# Patient Record
Sex: FEMALE | Race: OTHER | NOT HISPANIC OR LATINO
[De-identification: names, ages, dates, MRNs, and addresses within clinical notes are randomized per-mention and may not be internally consistent; named-entity substitution may affect disease eponyms.]

---

## 2020-04-26 ENCOUNTER — MESSAGE (OUTPATIENT)
Age: 35
End: 2020-04-26

## 2020-05-02 LAB
SARS-COV-2 IGG SERPL IA-ACNC: <0.1 INDEX
SARS-COV-2 IGG SERPL QL IA: NEGATIVE

## 2020-05-18 ENCOUNTER — OUTPATIENT (OUTPATIENT)
Dept: OUTPATIENT SERVICES | Facility: HOSPITAL | Age: 35
LOS: 1 days | Discharge: HOME | End: 2020-05-18
Payer: COMMERCIAL

## 2020-05-18 DIAGNOSIS — A15.0 TUBERCULOSIS OF LUNG: ICD-10-CM

## 2020-05-18 PROCEDURE — 71046 X-RAY EXAM CHEST 2 VIEWS: CPT | Mod: 26

## 2024-05-31 ENCOUNTER — EMERGENCY (EMERGENCY)
Facility: HOSPITAL | Age: 39
LOS: 1 days | Discharge: HOME | End: 2024-05-31
Attending: EMERGENCY MEDICINE | Admitting: EMERGENCY MEDICINE
Payer: COMMERCIAL

## 2024-05-31 VITALS
SYSTOLIC BLOOD PRESSURE: 138 MMHG | HEART RATE: 73 BPM | RESPIRATION RATE: 16 BRPM | DIASTOLIC BLOOD PRESSURE: 87 MMHG | TEMPERATURE: 98 F | OXYGEN SATURATION: 100 %

## 2024-05-31 LAB
APPEARANCE UR: CLEAR — SIGNIFICANT CHANGE UP
BASE EXCESS BLDV CALC-SCNC: 1.4 MMOL/L — SIGNIFICANT CHANGE UP (ref -2–3)
BASOPHILS # BLD AUTO: 0.05 K/UL — SIGNIFICANT CHANGE UP (ref 0–0.2)
BASOPHILS NFR BLD AUTO: 0.6 % — SIGNIFICANT CHANGE UP (ref 0–2)
BILIRUB UR-MCNC: NEGATIVE — SIGNIFICANT CHANGE UP
BLOOD GAS VENOUS COMPREHENSIVE RESULT: SIGNIFICANT CHANGE UP
CHLORIDE BLDV-SCNC: 101 MMOL/L — SIGNIFICANT CHANGE UP (ref 96–108)
CO2 BLDV-SCNC: 31.2 MMOL/L — HIGH (ref 22–26)
COLOR SPEC: YELLOW — SIGNIFICANT CHANGE UP
DIFF PNL FLD: NEGATIVE — SIGNIFICANT CHANGE UP
EOSINOPHIL # BLD AUTO: 0.17 K/UL — SIGNIFICANT CHANGE UP (ref 0–0.5)
EOSINOPHIL NFR BLD AUTO: 2 % — SIGNIFICANT CHANGE UP (ref 0–6)
GAS PNL BLDV: 136 MMOL/L — SIGNIFICANT CHANGE UP (ref 136–145)
GAS PNL BLDV: SIGNIFICANT CHANGE UP
GLUCOSE BLDV-MCNC: 100 MG/DL — HIGH (ref 70–99)
GLUCOSE UR QL: NEGATIVE MG/DL — SIGNIFICANT CHANGE UP
HCO3 BLDV-SCNC: 29 MMOL/L — SIGNIFICANT CHANGE UP (ref 22–29)
HCT VFR BLD CALC: 38.1 % — SIGNIFICANT CHANGE UP (ref 34.5–45)
HCT VFR BLDA CALC: 38 % — SIGNIFICANT CHANGE UP (ref 34.5–46.5)
HGB BLD CALC-MCNC: 12.6 G/DL — SIGNIFICANT CHANGE UP (ref 11.7–16.1)
HGB BLD-MCNC: 12 G/DL — SIGNIFICANT CHANGE UP (ref 11.5–15.5)
IANC: 6.84 K/UL — SIGNIFICANT CHANGE UP (ref 1.8–7.4)
IMM GRANULOCYTES NFR BLD AUTO: 0.4 % — SIGNIFICANT CHANGE UP (ref 0–0.9)
KETONES UR-MCNC: NEGATIVE MG/DL — SIGNIFICANT CHANGE UP
LACTATE BLDV-MCNC: 1.5 MMOL/L — SIGNIFICANT CHANGE UP (ref 0.5–2)
LEUKOCYTE ESTERASE UR-ACNC: NEGATIVE — SIGNIFICANT CHANGE UP
LYMPHOCYTES # BLD AUTO: 0.67 K/UL — LOW (ref 1–3.3)
LYMPHOCYTES # BLD AUTO: 8 % — LOW (ref 13–44)
MCHC RBC-ENTMCNC: 30.1 PG — SIGNIFICANT CHANGE UP (ref 27–34)
MCHC RBC-ENTMCNC: 31.5 GM/DL — LOW (ref 32–36)
MCV RBC AUTO: 95.5 FL — SIGNIFICANT CHANGE UP (ref 80–100)
MONOCYTES # BLD AUTO: 0.57 K/UL — SIGNIFICANT CHANGE UP (ref 0–0.9)
MONOCYTES NFR BLD AUTO: 6.8 % — SIGNIFICANT CHANGE UP (ref 2–14)
NEUTROPHILS # BLD AUTO: 6.84 K/UL — SIGNIFICANT CHANGE UP (ref 1.8–7.4)
NEUTROPHILS NFR BLD AUTO: 82.2 % — HIGH (ref 43–77)
NITRITE UR-MCNC: NEGATIVE — SIGNIFICANT CHANGE UP
NRBC # BLD: 0 /100 WBCS — SIGNIFICANT CHANGE UP (ref 0–0)
NRBC # FLD: 0 K/UL — SIGNIFICANT CHANGE UP (ref 0–0)
PCO2 BLDV: 61 MMHG — HIGH (ref 39–52)
PH BLDV: 7.29 — LOW (ref 7.32–7.43)
PH UR: 6.5 — SIGNIFICANT CHANGE UP (ref 5–8)
PLATELET # BLD AUTO: 211 K/UL — SIGNIFICANT CHANGE UP (ref 150–400)
PO2 BLDV: 21 MMHG — LOW (ref 25–45)
POTASSIUM BLDV-SCNC: 3.5 MMOL/L — SIGNIFICANT CHANGE UP (ref 3.5–5.1)
PROT UR-MCNC: NEGATIVE MG/DL — SIGNIFICANT CHANGE UP
RBC # BLD: 3.99 M/UL — SIGNIFICANT CHANGE UP (ref 3.8–5.2)
RBC # FLD: 11.3 % — SIGNIFICANT CHANGE UP (ref 10.3–14.5)
SAO2 % BLDV: 23.1 % — LOW (ref 67–88)
SP GR SPEC: 1.01 — SIGNIFICANT CHANGE UP (ref 1–1.03)
UROBILINOGEN FLD QL: 0.2 MG/DL — SIGNIFICANT CHANGE UP (ref 0.2–1)
WBC # BLD: 8.33 K/UL — SIGNIFICANT CHANGE UP (ref 3.8–10.5)
WBC # FLD AUTO: 8.33 K/UL — SIGNIFICANT CHANGE UP (ref 3.8–10.5)

## 2024-05-31 PROCEDURE — 76830 TRANSVAGINAL US NON-OB: CPT | Mod: 26

## 2024-05-31 PROCEDURE — 99284 EMERGENCY DEPT VISIT MOD MDM: CPT

## 2024-05-31 RX ORDER — SODIUM CHLORIDE 9 MG/ML
1000 INJECTION INTRAMUSCULAR; INTRAVENOUS; SUBCUTANEOUS ONCE
Refills: 0 | Status: COMPLETED | OUTPATIENT
Start: 2024-05-31 | End: 2024-05-31

## 2024-05-31 RX ADMIN — SODIUM CHLORIDE 1000 MILLILITER(S): 9 INJECTION INTRAMUSCULAR; INTRAVENOUS; SUBCUTANEOUS at 23:00

## 2024-05-31 NOTE — ED PROVIDER NOTE - PROGRESS NOTE DETAILS
yolanda: ultrasound results reviewed with the patient.  as pt has known cyst requiring surgery in 3 days on left, she can bring results of US to her obgyn, pt without signs of torsion on this US but does have large ovary so has risk,  pt has been pain free during time i9n the ED.  advised to return if sudden onset of severe pain like previously.

## 2024-05-31 NOTE — ED PROVIDER NOTE - NSFOLLOWUPINSTRUCTIONS_ED_ALL_ED_FT
you have ovarian cysts on the right and the left. you do not have a torsion on the current ultrasound.  if you have sudden onset of abdominal pain please return to the ED immediately as you could have a torsion of your ovary and be at risk of losing the ovary if not tended to.    your other labs are also attached.  please show them to your ob before your surgery.    You are being discharged from the Emergency Department after evaluation of your presenting problem.  You were found not to have an emergency that requires hospitalization or surgery, but this does not mean you do not have a health concern.  You should follow up with your primary care physician and any other physicians suggested at time of discharge.  Also, if your condition worsens or changes know that the emergency department is open and available 24 hours a day/ 7 days a week and you should return to us if you have concerns. Thank you for allowing us to participate in your care.

## 2024-05-31 NOTE — ED PROVIDER NOTE - CLINICAL SUMMARY MEDICAL DECISION MAKING FREE TEXT BOX
Nancy Diego MD attending physician patient is a 38-year-old woman who comes in with known history of ovarian cyst who had severe abdominal pain that is now improving and was vomiting from the abdominal pain.  She actually has surgery planned for 3 days from now for the cyst on the left which she believes is about 6 cm the pain was on the right.  She denies fever chest pain shortness of breath.  She does have some back pain.    Vital signs include blood pressure 138/87 respiratory rate 16 heart rate 73 afebrile O2 sat is normal.  Pt alert and can phonate well  h at/nc  perrl, conj clear, sclera anicteric,  neck supple  abd soft no r/g/ mild suprapubic tenderness  GYN exam chaperoned by maddison Webb.  No external lesions no discharge no bleeding.  No bladder tenderness no right or left wall tenderness vaginal walls.  Uterus very firm cervix large uterus tenderness right-sided adnexa.  ext no edema no deformities  neueo awake, lucid normal gait moves all extremities with strength  psych normal affect  vs reasonable    Plan transvaginal ultrasound and lab work.  This is likely GYN related.  Patient not currently in pain but could be a rupture or could be a torsion

## 2024-05-31 NOTE — ED ADULT TRIAGE NOTE - CHIEF COMPLAINT QUOTE
pt c/o RLQ pain and vomiting x2 hours. LMP May 2024. hx. ovarian cyst. pt denies chest pain, sob, vaginal bleeding, fever. pt well appearing.

## 2024-05-31 NOTE — ED ADULT TRIAGE NOTE - SOURCE OF INFORMATION
Patient Split-Thickness Skin Graft Text: The defect edges were debeveled with a #15 scalpel blade.  Given the location of the defect, shape of the defect and the proximity to free margins a split thickness skin graft was deemed most appropriate.  Using a sterile surgical marker, the primary defect shape was transferred to the donor site. The split thickness graft was then harvested.  The skin graft was then placed in the primary defect and oriented appropriately.

## 2024-05-31 NOTE — ED PROVIDER NOTE - PATIENT PORTAL LINK FT
You can access the FollowMyHealth Patient Portal offered by [facility] by registering at the following website: [email or web address]. By joining [employer]’s FollowMyHealth portal, you will also be able to view your health information using other applications (apps) compatible with our system.

## 2024-06-01 LAB
ALBUMIN SERPL ELPH-MCNC: 4.4 G/DL — SIGNIFICANT CHANGE UP (ref 3.3–5)
ALP SERPL-CCNC: 56 U/L — SIGNIFICANT CHANGE UP (ref 40–120)
ALT FLD-CCNC: 14 U/L — SIGNIFICANT CHANGE UP (ref 4–33)
ANION GAP SERPL CALC-SCNC: 16 MMOL/L — HIGH (ref 7–14)
AST SERPL-CCNC: 22 U/L — SIGNIFICANT CHANGE UP (ref 4–32)
BILIRUB SERPL-MCNC: 0.5 MG/DL — SIGNIFICANT CHANGE UP (ref 0.2–1.2)
BUN SERPL-MCNC: 10 MG/DL — SIGNIFICANT CHANGE UP (ref 7–23)
CALCIUM SERPL-MCNC: 9.2 MG/DL — SIGNIFICANT CHANGE UP (ref 8.4–10.5)
CHLORIDE SERPL-SCNC: 100 MMOL/L — SIGNIFICANT CHANGE UP (ref 98–107)
CO2 SERPL-SCNC: 23 MMOL/L — SIGNIFICANT CHANGE UP (ref 22–31)
CREAT SERPL-MCNC: 0.63 MG/DL — SIGNIFICANT CHANGE UP (ref 0.5–1.3)
EGFR: 115 ML/MIN/1.73M2 — SIGNIFICANT CHANGE UP
GLUCOSE SERPL-MCNC: 104 MG/DL — HIGH (ref 70–99)
HCG SERPL-ACNC: <1 MIU/ML — SIGNIFICANT CHANGE UP
HCV AB S/CO SERPL IA: 0.12 S/CO — SIGNIFICANT CHANGE UP (ref 0–0.99)
HCV AB SERPL-IMP: SIGNIFICANT CHANGE UP
LIDOCAIN IGE QN: 58 U/L — SIGNIFICANT CHANGE UP (ref 7–60)
POTASSIUM SERPL-MCNC: 3.6 MMOL/L — SIGNIFICANT CHANGE UP (ref 3.5–5.3)
POTASSIUM SERPL-SCNC: 3.6 MMOL/L — SIGNIFICANT CHANGE UP (ref 3.5–5.3)
PROT SERPL-MCNC: 7.8 G/DL — SIGNIFICANT CHANGE UP (ref 6–8.3)
SODIUM SERPL-SCNC: 139 MMOL/L — SIGNIFICANT CHANGE UP (ref 135–145)

## 2024-06-01 NOTE — ED ADULT NURSE REASSESSMENT NOTE - NS ED NURSE REASSESS COMMENT FT1
Pt received from intake to results waiting. Well appearing sitting up in chair HOB elevated. C/o mild right lower quadrant abdominal pain at this time. Denies CP, SOB, nausea, vomiting, headache, lightheadedness, dizziness, fever or chills. Airway patent, speaking in full and complete sentences. Respirations even and unlabored with equal chest rise. No acute distress noted. Safety maintained. Awaiting US results.

## 2024-06-02 LAB
CULTURE RESULTS: SIGNIFICANT CHANGE UP
SPECIMEN SOURCE: SIGNIFICANT CHANGE UP

## 2025-07-08 ENCOUNTER — EMERGENCY (EMERGENCY)
Facility: HOSPITAL | Age: 40
LOS: 1 days | End: 2025-07-08
Attending: STUDENT IN AN ORGANIZED HEALTH CARE EDUCATION/TRAINING PROGRAM | Admitting: STUDENT IN AN ORGANIZED HEALTH CARE EDUCATION/TRAINING PROGRAM
Payer: COMMERCIAL

## 2025-07-08 VITALS
RESPIRATION RATE: 16 BRPM | OXYGEN SATURATION: 100 % | TEMPERATURE: 98 F | DIASTOLIC BLOOD PRESSURE: 67 MMHG | HEART RATE: 64 BPM | SYSTOLIC BLOOD PRESSURE: 118 MMHG

## 2025-07-08 VITALS
SYSTOLIC BLOOD PRESSURE: 143 MMHG | RESPIRATION RATE: 18 BRPM | HEIGHT: 62 IN | WEIGHT: 100.09 LBS | DIASTOLIC BLOOD PRESSURE: 90 MMHG | OXYGEN SATURATION: 98 % | HEART RATE: 66 BPM | TEMPERATURE: 98 F

## 2025-07-08 LAB
ALBUMIN SERPL ELPH-MCNC: 4.6 G/DL — SIGNIFICANT CHANGE UP (ref 3.3–5)
ALP SERPL-CCNC: 44 U/L — SIGNIFICANT CHANGE UP (ref 40–120)
ALT FLD-CCNC: 12 U/L — SIGNIFICANT CHANGE UP (ref 4–33)
ANION GAP SERPL CALC-SCNC: 13 MMOL/L — SIGNIFICANT CHANGE UP (ref 7–14)
APPEARANCE UR: CLEAR — SIGNIFICANT CHANGE UP
AST SERPL-CCNC: 19 U/L — SIGNIFICANT CHANGE UP (ref 4–32)
BACTERIA # UR AUTO: NEGATIVE /HPF — SIGNIFICANT CHANGE UP
BASOPHILS # BLD AUTO: 0.03 K/UL — SIGNIFICANT CHANGE UP (ref 0–0.2)
BASOPHILS # BLD MANUAL: 0 K/UL — SIGNIFICANT CHANGE UP (ref 0–0.2)
BASOPHILS NFR BLD AUTO: 0.4 % — SIGNIFICANT CHANGE UP (ref 0–2)
BASOPHILS NFR BLD MANUAL: 0 % — SIGNIFICANT CHANGE UP (ref 0–2)
BILIRUB SERPL-MCNC: 0.7 MG/DL — SIGNIFICANT CHANGE UP (ref 0.2–1.2)
BILIRUB UR-MCNC: NEGATIVE — SIGNIFICANT CHANGE UP
BUN SERPL-MCNC: 12 MG/DL — SIGNIFICANT CHANGE UP (ref 7–23)
CALCIUM SERPL-MCNC: 8.9 MG/DL — SIGNIFICANT CHANGE UP (ref 8.4–10.5)
CAST: 0 /LPF — SIGNIFICANT CHANGE UP (ref 0–4)
CHLORIDE SERPL-SCNC: 101 MMOL/L — SIGNIFICANT CHANGE UP (ref 98–107)
CO2 SERPL-SCNC: 20 MMOL/L — LOW (ref 22–31)
COLOR SPEC: YELLOW — SIGNIFICANT CHANGE UP
CREAT SERPL-MCNC: 0.47 MG/DL — LOW (ref 0.5–1.3)
DIFF PNL FLD: ABNORMAL
EGFR: 123 ML/MIN/1.73M2 — SIGNIFICANT CHANGE UP
EGFR: 123 ML/MIN/1.73M2 — SIGNIFICANT CHANGE UP
EOSINOPHIL # BLD AUTO: 0 K/UL — SIGNIFICANT CHANGE UP (ref 0–0.5)
EOSINOPHIL # BLD MANUAL: 0 K/UL — SIGNIFICANT CHANGE UP (ref 0–0.5)
EOSINOPHIL NFR BLD AUTO: 0 % — SIGNIFICANT CHANGE UP (ref 0–6)
EOSINOPHIL NFR BLD MANUAL: 0 % — SIGNIFICANT CHANGE UP (ref 0–6)
GIANT PLATELETS BLD QL SMEAR: PRESENT
GLUCOSE SERPL-MCNC: 135 MG/DL — HIGH (ref 70–99)
GLUCOSE UR QL: NEGATIVE MG/DL — SIGNIFICANT CHANGE UP
HCG SERPL-ACNC: <1 MIU/ML — SIGNIFICANT CHANGE UP
HCT VFR BLD CALC: 36.4 % — SIGNIFICANT CHANGE UP (ref 34.5–45)
HGB BLD-MCNC: 11.9 G/DL — SIGNIFICANT CHANGE UP (ref 11.5–15.5)
IMM GRANULOCYTES # BLD AUTO: 0.03 K/UL — SIGNIFICANT CHANGE UP (ref 0–0.07)
IMM GRANULOCYTES NFR BLD AUTO: 0.4 % — SIGNIFICANT CHANGE UP (ref 0–0.9)
KETONES UR QL: 15 MG/DL
LEUKOCYTE ESTERASE UR-ACNC: NEGATIVE — SIGNIFICANT CHANGE UP
LIDOCAIN IGE QN: 51 U/L — SIGNIFICANT CHANGE UP (ref 7–60)
LYMPHOCYTES # BLD AUTO: 0.37 K/UL — LOW (ref 1–3.3)
LYMPHOCYTES # BLD MANUAL: 0.21 K/UL — LOW (ref 1–3.3)
LYMPHOCYTES NFR BLD AUTO: 4.6 % — LOW (ref 13–44)
LYMPHOCYTES NFR BLD MANUAL: 2.6 % — LOW (ref 13–44)
MCHC RBC-ENTMCNC: 31.1 PG — SIGNIFICANT CHANGE UP (ref 27–34)
MCHC RBC-ENTMCNC: 32.7 G/DL — SIGNIFICANT CHANGE UP (ref 32–36)
MCV RBC AUTO: 95 FL — SIGNIFICANT CHANGE UP (ref 80–100)
MONOCYTES # BLD AUTO: 0.34 K/UL — SIGNIFICANT CHANGE UP (ref 0–0.9)
MONOCYTES # BLD MANUAL: 0.14 K/UL — SIGNIFICANT CHANGE UP (ref 0–0.9)
MONOCYTES NFR BLD AUTO: 4.2 % — SIGNIFICANT CHANGE UP (ref 2–14)
MONOCYTES NFR BLD MANUAL: 1.8 % — LOW (ref 2–14)
NEUTROPHILS # BLD AUTO: 7.26 K/UL — SIGNIFICANT CHANGE UP (ref 1.8–7.4)
NEUTROPHILS # BLD MANUAL: 7.68 K/UL — HIGH (ref 1.8–7.4)
NEUTROPHILS NFR BLD AUTO: 90.4 % — HIGH (ref 43–77)
NEUTROPHILS NFR BLD MANUAL: 95.6 % — HIGH (ref 43–77)
NITRITE UR-MCNC: NEGATIVE — SIGNIFICANT CHANGE UP
NRBC # BLD AUTO: 0 K/UL — SIGNIFICANT CHANGE UP (ref 0–0)
NRBC # FLD: 0 K/UL — SIGNIFICANT CHANGE UP (ref 0–0)
NRBC BLD AUTO-RTO: 0 /100 WBCS — SIGNIFICANT CHANGE UP (ref 0–0)
PH UR: 6.5 — SIGNIFICANT CHANGE UP (ref 5–8)
PLAT MORPH BLD: NORMAL — SIGNIFICANT CHANGE UP
PLATELET # BLD AUTO: 214 K/UL — SIGNIFICANT CHANGE UP (ref 150–400)
PLATELET COUNT - ESTIMATE: NORMAL — SIGNIFICANT CHANGE UP
PMV BLD: 11.3 FL — SIGNIFICANT CHANGE UP (ref 7–13)
POTASSIUM SERPL-MCNC: 3.8 MMOL/L — SIGNIFICANT CHANGE UP (ref 3.5–5.3)
POTASSIUM SERPL-SCNC: 3.8 MMOL/L — SIGNIFICANT CHANGE UP (ref 3.5–5.3)
PROT SERPL-MCNC: 7.6 G/DL — SIGNIFICANT CHANGE UP (ref 6–8.3)
PROT UR-MCNC: NEGATIVE MG/DL — SIGNIFICANT CHANGE UP
RBC # BLD: 3.83 M/UL — SIGNIFICANT CHANGE UP (ref 3.8–5.2)
RBC # FLD: 11.7 % — SIGNIFICANT CHANGE UP (ref 10.3–14.5)
RBC BLD AUTO: NORMAL — SIGNIFICANT CHANGE UP
RBC CASTS # UR COMP ASSIST: 4 /HPF — SIGNIFICANT CHANGE UP (ref 0–4)
SMUDGE CELLS # BLD: PRESENT
SODIUM SERPL-SCNC: 134 MMOL/L — LOW (ref 135–145)
SP GR SPEC: 1.02 — SIGNIFICANT CHANGE UP (ref 1–1.03)
SQUAMOUS # UR AUTO: 1 /HPF — SIGNIFICANT CHANGE UP (ref 0–5)
UROBILINOGEN FLD QL: 0.2 MG/DL — SIGNIFICANT CHANGE UP (ref 0.2–1)
WBC # BLD: 8.03 K/UL — SIGNIFICANT CHANGE UP (ref 3.8–10.5)
WBC # FLD AUTO: 8.03 K/UL — SIGNIFICANT CHANGE UP (ref 3.8–10.5)
WBC UR QL: 0 /HPF — SIGNIFICANT CHANGE UP (ref 0–5)

## 2025-07-08 PROCEDURE — 76830 TRANSVAGINAL US NON-OB: CPT | Mod: 26

## 2025-07-08 PROCEDURE — 74177 CT ABD & PELVIS W/CONTRAST: CPT | Mod: 26

## 2025-07-08 PROCEDURE — 99284 EMERGENCY DEPT VISIT MOD MDM: CPT

## 2025-07-08 PROCEDURE — 99285 EMERGENCY DEPT VISIT HI MDM: CPT

## 2025-07-08 RX ORDER — KETOROLAC TROMETHAMINE 30 MG/ML
15 INJECTION, SOLUTION INTRAMUSCULAR; INTRAVENOUS ONCE
Refills: 0 | Status: DISCONTINUED | OUTPATIENT
Start: 2025-07-08 | End: 2025-07-08

## 2025-07-08 RX ORDER — OXYCODONE HYDROCHLORIDE 30 MG/1
1 TABLET ORAL
Qty: 5 | Refills: 0
Start: 2025-07-08 | End: 2025-07-10

## 2025-07-08 RX ORDER — ACETAMINOPHEN 500 MG/5ML
675 LIQUID (ML) ORAL ONCE
Refills: 0 | Status: COMPLETED | OUTPATIENT
Start: 2025-07-08 | End: 2025-07-08

## 2025-07-08 RX ORDER — OXYCODONE HYDROCHLORIDE 30 MG/1
1 TABLET ORAL
Refills: 0
Start: 2025-07-08

## 2025-07-08 RX ORDER — OXYCODONE HYDROCHLORIDE 30 MG/1
1 TABLET ORAL
Qty: 3 | Refills: 0
Start: 2025-07-08 | End: 2025-07-08

## 2025-07-08 RX ORDER — OXYCODONE HYDROCHLORIDE AND ACETAMINOPHEN 10; 325 MG/1; MG/1
1 TABLET ORAL
Refills: 0
Start: 2025-07-08

## 2025-07-08 RX ORDER — ONDANSETRON HCL/PF 4 MG/2 ML
4 VIAL (ML) INJECTION ONCE
Refills: 0 | Status: COMPLETED | OUTPATIENT
Start: 2025-07-08 | End: 2025-07-08

## 2025-07-08 RX ADMIN — Medication 1400 MILLILITER(S): at 02:05

## 2025-07-08 RX ADMIN — KETOROLAC TROMETHAMINE 15 MILLIGRAM(S): 30 INJECTION, SOLUTION INTRAMUSCULAR; INTRAVENOUS at 09:31

## 2025-07-08 RX ADMIN — Medication 4 MILLIGRAM(S): at 02:05

## 2025-07-08 RX ADMIN — Medication 4 MILLIGRAM(S): at 06:34

## 2025-07-08 RX ADMIN — Medication 270 MILLIGRAM(S): at 04:21

## 2025-07-08 RX ADMIN — Medication 4 MILLIGRAM(S): at 02:04

## 2025-07-08 RX ADMIN — Medication 2 MILLIGRAM(S): at 09:30

## 2025-07-08 NOTE — ED PROVIDER NOTE - PROGRESS NOTE DETAILS
DO Sabine (PGY2): Received sign out, pending CT results.   CT showed large right adnexal cystic lesion, 5 x 3.7 x 4.2 cm. US ordered to rule out torsion.   On reassessment, the patient still complains of pain. Toradol IV ordered.  Consulted OBGYN who will come evaluate the patient. CT shows large complex cyst in the right ovary, pending OBGYN consult Aileen Over, DO --pt signed out to me at shift change by Dr. Jj pending CT results, reeval  on reeval, pt reports still having significant pain in her right side. treated with additional morphine. DO Sabine (PGY2):  US showed 4.9 cm complex right ovarian cyst which may represent hemorrhagic cyst.   Informed OBGYN of results, pending their recommendations. DO Sabine (PGY2):  Evaluated by OBGYN. No acute interventions at this time but reevaluate for pain in another hour. DO Sabine (PGY2):  Reevaluated by OBGYN. Patient reports improvement of her pain. Recommended prescription for oxycodone for severe pain. Recommended taking Tylenol and Ibuprofen for pain.Instructed the importance of following up with their OBGYN. OBGYN referral given. Strict return precautions given. The patient expressed understanding and feels comfortable being discharged home. DO Sabine (PGY2):  Reevaluated by OBGYN. Patient reports improvement of her pain. Recommended prescription for oxycodone for severe pain. Recommended taking Tylenol and Ibuprofen for pain. Instructed the importance of following up with their OBGYN. OBGYN referral given. Strict return precautions given. The patient expressed understanding and feels comfortable being discharged home.

## 2025-07-08 NOTE — ED PROVIDER NOTE - NSFOLLOWUPINSTRUCTIONS_ED_ALL_ED_FT
YOU WERE SEEN IN THE ED FOR: right hemorrhagic ovarian cyst    Follow up with your OBGYN this week.     Please continue to take any home medications as prescribed  Your test results from your ED visit were discussed with you prior to discharge  You were provided with a copy of your test results    Please take Acetaminophen 975 mg every 6 hours as needed for pain. Please do not exceed more than 4,000 mg of Tylenol in a day  Please take Ibuprofen 600 mg by mouth every 6 hours as needed for pain. Please take this medication with food.  You were given a prescription for oxycodone 5mg. Please take 1 tablet every 8 hours as needed for SEVERE pain.    Ovarian Cyst  An ovarian cyst is a fluid-filled sac that grows in or on an ovary. You have 2 ovaries, 1 on each side of your uterus. They are small, about the size and shape of an almond. Ovarian cysts are common in women who have regular monthly cycles. During your monthly cycle, eggs are released from the ovaries. The cyst usually contains fluid but may sometimes have blood or tissue in it. Most ovarian cysts are harmless and go away without treatment in a few months. Some cysts can grow large, cause pain, or break open.    SEEK IMMEDIATE MEDICAL CARE IF YOU HAVE ANY OF THE FOLLOWING SYMPTOMS: worsening abdominal pain, uncontrollable vomiting, lightheadedness, dizziness, vaginal bleeding or discharge (saturating 1 pad per hour for 2+ hours), fever

## 2025-07-08 NOTE — ED ADULT NURSE NOTE - OBJECTIVE STATEMENT
Pt received in intake 16. Pt alert and oriented x 4, ambulatory at baseline. Hx of uterine fibroid. Pt c/o lower back pain that began yesterday morning and progressively worsened. Pt endorses dysuria. Pt reports taking Tylenol and Advil at home with no relief. Pt reports 3 episodes of nonbloody vomit. Respirations even and unlabored, NAD. Pt denies chest pain, shortness of breath, diarrhea, headache, dizziness, weakness, fever, chills. 20G IV in the right AC labs drawn and pt medicated per orders. Bed in lowest position, safety maintained.

## 2025-07-08 NOTE — CONSULT NOTE ADULT - SUBJECTIVE AND OBJECTIVE BOX
CALIN BILLINGSLEY  39y  Female [id number]    HPI: 40 y/o P0 LMP 7/2 presenting with RLQ pain since yesterday morning. Patient reports pain started in the AM, has progressively worsened. Reports 3-4 episodes of emesis yesterday evening. Took Tylenol yesterday with minimal relief. States pain is now radiating along her right flank and down her right leg. Denies fevers, chills, SOB, chest pain, abnormal vaginal discharge, dysuria.       Name of GYN Physician: [facility]  OBHx: P0  GynHx: Hx fibroids, cysts and endometriosis. Denies STI's, Abnormal pap smears   PMH: Denies  PSH: LSC ovarian cystectomy, Robotic L oophorectomy, UAE for fibroids  Meds: Denies  Allx: NKDA  Social History:  Denies smoking use, drug use, alcohol use.       Vital Signs Last 24 Hrs  T(C): 36.9 (2025 09:14), Max: 36.9 (2025 09:14)  T(F): 98.5 (2025 09:14), Max: 98.5 (2025 09:14)  HR: 66 (2025 09:14) (60 - 66)  BP: 136/66 (2025 09:14) (136/66 - 155/94)  BP(mean): --  RR: 18 (2025 09:14) (16 - 18)  SpO2: 100% (2025 09:14) (98% - 100%)    Parameters below as of 2025 09:14  Patient On (Oxygen Delivery Method): room air        Physical Exam:   General: sitting comfortably in bed, NAD   HEENT: neck supple, full ROM  CV: well perfused  Lungs: normal respirations   Abd: Soft, non-distended. Mild tenderness to deep palpation of RLQ.   :  No bleeding on pad.    LABS:                        11.9   8.03  )-----------( 214      ( 2025 02:03 )             36.4     07-08    134[L]  |  101  |  12  ----------------------------<  135[H]  3.8   |  20[L]  |  0.47[L]    Ca    8.9      2025 02:03    TPro  7.6  /  Alb  4.6  /  TBili  0.7  /  DBili  x   /  AST  19  /  ALT  12  /  AlkPhos  44  -    I&O's Detail      Urinalysis Basic - ( 2025 03:30 )    Color: Yellow / Appearance: Clear / S.020 / pH: x  Gluc: x / Ketone: x  / Bili: Negative / Urobili: 0.2 mg/dL   Blood: x / Protein: Negative mg/dL / Nitrite: Negative   Leuk Esterase: Negative / RBC: 4 /HPF / WBC 0 /HPF   Sq Epi: x / Non Sq Epi: 1 /HPF / Bacteria: Negative /HPF        RADIOLOGY & ADDITIONAL STUDIES:    < from: CT Abdomen and Pelvis w/ IV Cont (25 @ 07:14) >  ACC: [id number] EXAM:  CT ABDOMEN AND PELVIS IC   ORDERED BY: DELFIN DRIVER     PROCEDURE DATE:  2025          INTERPRETATION:  CLINICAL INFORMATION: Right flank pain. Nausea and   vomiting. Assess for renal stone versus pyelonephritis.    COMPARISON: Pelvic ultrasound 2024.    CONTRAST/COMPLICATIONS:  IV Contrast: Omnipaque 350  90 cc administered   10 cc discarded  Oral Contrast: NONE.    PROCEDURE:  CT of the Abdomen and Pelvis was performed.  Sagittal and coronal reformats were performed.    FINDINGS:  LOWER CHEST: Within normal limits.    LIVER: Ill-defined hypoattenuation along the falciform ligament, likely   focal fat. Additional subcentimeter hypoattenuating hepatic foci, too   small to characterize.  BILE DUCTS: Normal caliber.  GALLBLADDER: Within normal limits.  SPLEEN: Within normal limits.  PANCREAS: Within normal limits.  ADRENALS: Within normal limits.  KIDNEYS/URETERS: Bilateral symmetric renal enhancement. No   hydronephrosis. Left renal upper pole cyst. Bilateral renal subcentimeter   hypoattenuating foci, too small to characterize.    BLADDER: Within normal limits.  REPRODUCTIVE ORGANS: Uterine fibroids. Large right adnexal cystic lesion,   5 x 3.7 x 4.2 cm.    BOWEL: No bowel obstruction. Appendix is not visualized. No evidence of   inflammation in the pericecal region.  PERITONEUM/RETROPERITONEUM: Trace free fluid in the pelvis.  VESSELS: Within normal limits.  LYMPH NODES: No lymphadenopathy.  ABDOMINAL WALL: Within normal limits.  BONES: Within normal limits.    IMPRESSION:  No CT evidence of renal stone or pyelonephritis as clinically questioned.    Large right adnexal cystic lesion, 5 x 3.7 x 4.2 cm. Note large size is a   risk factor for ovarian torsion. Consider further evaluation with pelvic   ultrasound.    --- End of Report ---      DANETTE MO MD; Resident Radiologist  This document has been electronically signed.  MARILIN TIJERINA MD; Attending Radiologist  This document has been electronically signed. 2025  8:50AM    < end of copied text >   CALIN BILLINGSLEY  39y  Female [id number]    HPI: 38 y/o P0 LMP 7/2 presenting with RLQ pain since yesterday morning. Patient reports pain started in the AM, has progressively worsened. Reports 3-4 episodes of emesis yesterday evening. Took Tylenol yesterday with minimal relief. States pain is now radiating along her right flank and down her right leg. Denies fevers, chills, SOB, chest pain, abnormal vaginal discharge, dysuria.       Name of GYN Physician: [facility]  OBHx: P0  GynHx: Hx fibroids, cysts and endometriosis. Denies STI's, Abnormal pap smears   PMH: Denies  PSH: LSC ovarian cystectomy, Robotic L oophorectomy, UAE for fibroids  Meds: Denies  Allx: NKDA  Social History:  Denies smoking use, drug use, alcohol use.       Vital Signs Last 24 Hrs  T(C): 36.9 (2025 09:14), Max: 36.9 (2025 09:14)  T(F): 98.5 (2025 09:14), Max: 98.5 (2025 09:14)  HR: 66 (2025 09:14) (60 - 66)  BP: 136/66 (2025 09:14) (136/66 - 155/94)  BP(mean): --  RR: 18 (2025 09:14) (16 - 18)  SpO2: 100% (2025 09:14) (98% - 100%)    Parameters below as of 2025 09:14  Patient On (Oxygen Delivery Method): room air        Physical Exam:   General: sitting comfortably in bed, NAD   HEENT: neck supple, full ROM  CV: well perfused  Lungs: normal respirations   Abd: Soft, non-distended. Mild tenderness to deep palpation of RLQ.   :  No bleeding on pad.    LABS:                        11.9   8.03  )-----------( 214      ( 2025 02:03 )             36.4     07-08    134[L]  |  101  |  12  ----------------------------<  135[H]  3.8   |  20[L]  |  0.47[L]    Ca    8.9      2025 02:03    TPro  7.6  /  Alb  4.6  /  TBili  0.7  /  DBili  x   /  AST  19  /  ALT  12  /  AlkPhos  44  -    I&O's Detail      Urinalysis Basic - ( 2025 03:30 )    Color: Yellow / Appearance: Clear / S.020 / pH: x  Gluc: x / Ketone: x  / Bili: Negative / Urobili: 0.2 mg/dL   Blood: x / Protein: Negative mg/dL / Nitrite: Negative   Leuk Esterase: Negative / RBC: 4 /HPF / WBC 0 /HPF   Sq Epi: x / Non Sq Epi: 1 /HPF / Bacteria: Negative /HPF        RADIOLOGY & ADDITIONAL STUDIES:    < from: CT Abdomen and Pelvis w/ IV Cont (25 @ 07:14) >  ACC: [id number] EXAM:  CT ABDOMEN AND PELVIS IC   ORDERED BY: DELFIN DRIVER     PROCEDURE DATE:  2025          INTERPRETATION:  CLINICAL INFORMATION: Right flank pain. Nausea and   vomiting. Assess for renal stone versus pyelonephritis.    COMPARISON: Pelvic ultrasound 2024.    CONTRAST/COMPLICATIONS:  IV Contrast: Omnipaque 350  90 cc administered   10 cc discarded  Oral Contrast: NONE.    PROCEDURE:  CT of the Abdomen and Pelvis was performed.  Sagittal and coronal reformats were performed.    FINDINGS:  LOWER CHEST: Within normal limits.    LIVER: Ill-defined hypoattenuation along the falciform ligament, likely   focal fat. Additional subcentimeter hypoattenuating hepatic foci, too   small to characterize.  BILE DUCTS: Normal caliber.  GALLBLADDER: Within normal limits.  SPLEEN: Within normal limits.  PANCREAS: Within normal limits.  ADRENALS: Within normal limits.  KIDNEYS/URETERS: Bilateral symmetric renal enhancement. No   hydronephrosis. Left renal upper pole cyst. Bilateral renal subcentimeter   hypoattenuating foci, too small to characterize.    BLADDER: Within normal limits.  REPRODUCTIVE ORGANS: Uterine fibroids. Large right adnexal cystic lesion,   5 x 3.7 x 4.2 cm.    BOWEL: No bowel obstruction. Appendix is not visualized. No evidence of   inflammation in the pericecal region.  PERITONEUM/RETROPERITONEUM: Trace free fluid in the pelvis.  VESSELS: Within normal limits.  LYMPH NODES: No lymphadenopathy.  ABDOMINAL WALL: Within normal limits.  BONES: Within normal limits.    IMPRESSION:  No CT evidence of renal stone or pyelonephritis as clinically questioned.    Large right adnexal cystic lesion, 5 x 3.7 x 4.2 cm. Note large size is a   risk factor for ovarian torsion. Consider further evaluation with pelvic   ultrasound.    --- End of Report ---      DANETTE MO MD; Resident Radiologist  This document has been electronically signed.  MARILIN TIJERINA MD; Attending Radiologist  This document has been electronically signed. 2025  8:50AM    < end of copied text >    < from: US Transvaginal (25 @ 10:10) >    ACC: [id number] EXAM:  US TRANSVAGINAL   ORDERED BY: DAMIEN HOSKINS     PROCEDURE DATE:  2025          INTERPRETATION:  CLINICAL INFORMATION: Large right adnexal cyst on CT.    LMP: 2025    COMPARISON: CT abdomen pelvis 2025.    TECHNIQUE:  Endovaginal pelvic sonogram as per order. Transabdominal pelvic sonogram   was also performed as part of our standard protocol. Color and Spectral   Doppler was performed.    FINDINGS:  Uterus: 6.8 cm x 4.5 cm x 3.6 cm. Calcified right sided fibroid measuring   3.8 x 2.5 x 2.4 cm.  Endometrium: 5 mm. Within normal limits.    Right ovary: 4.7 cm x 4.1 cm x 4.9 cm complex cyst with septations and   fluid debris level. Limited arterial Doppler. Ovarian venous waveforms   demonstrated.  Left ovary: Not visualized.    Fluid: Trace fluid in the left adnexa.    IMPRESSION:  4.9 cm complex right ovarian cyst which may represent hemorrhagic cyst.   Ovarian venous waveforms demonstrated. Limited arterial Doppler.      --- End of Report ---        XIOMY LAFLEUR MD; Attending Radiologist  This document has been electronically signed. 2025 11:10AM    < end of copied text >

## 2025-07-08 NOTE — ED PROVIDER NOTE - NSFOLLOWUPCLINICS_GEN_ALL_ED_FT
[facility]  OB/GYN & Surg  [id number] [place] [address]  Phone: [phone]  Fax:

## 2025-07-08 NOTE — CONSULT NOTE ADULT - ASSESSMENT
*incomplete note*    40 y/o P0 LMP 7/2 presenting with RLQ pain since yesterday morning. 40 y/o P0 LMP 7/2 presenting with RLQ pain since yesterday morning. TVUS w/ 4.7cm x 4.1cm x 4.9cm complex cyst with septations and fluid debris level, likely endometrioma given patients history of extensive endometriosis. Physical exam with mild tenderness to palpation of RLQ. VSS, labs within normal limits. Patient reports significant improvement in pain, discussed options for future management of endometriosis.     - Recommend f/u with GYN in 1-2 weeks, name provided for WC or can see private OBGYN  - Discussed options such as Orilissa, patient prefers to research medication and follow up with outpt GYN for prescription   - Recommend 5 tabs of Oxycodone to take for severe pain as needed, with standing Motrin and Tylenol  - No acute GYN intervention     ANITRA Nj  Seen w/ Dr. Alvarenga

## 2025-07-08 NOTE — ED PROVIDER NOTE - PATIENT PORTAL LINK FT
You can access the FollowMyHealth Patient Portal offered by [facility] by registering at the following website: [email or web address]. By joining [employer]’s FollowMyHealth portal, you will also be able to view your health information using other applications (apps) compatible with our system.

## 2025-07-08 NOTE — ED ADULT TRIAGE NOTE - CHIEF COMPLAINT QUOTE
Pt has right lower back pain  radiating down her right leg since Yesterday morning. Vomited x3. Also has dysuria.  Denies fever or diarrhea.  Took Tylenol at 2pm and Advil at 7pm with no relief. History of uterine fibroid,

## 2025-07-08 NOTE — ED PROVIDER NOTE - CLINICAL SUMMARY MEDICAL DECISION MAKING FREE TEXT BOX
Donaldo Jj MD (Attending MD):  patient is a 39-year-old female with  history of left oophorectomy presenting to emergency department with right flank pain.  Began today.  However has been going on for some time.  Associated with nausea, vomiting.  Also radiates down her bilateral legs.  No history of kidney stones.  No fevers, chills.  No hematuria.    Review of systems otherwise negative    General: Alert and Orientated x 3. No apparent distress.  Head: Normocephalic and atraumatic.  Eyes: PERRLA with EOMI.  Neck: Supple. Trachea midline.   Cardiac: Normal S1 and S2 w/ RRR. No murmurs appreciated. No JVD appreciated.  Pulmonary: CTA bilaterally. No increased WOB. No wheezes or crackles.  Abdominal: Soft, non-tender. (+) bowel sounds appreciated in all 4 quadrants. No hepatosplenomegaly. R CVA TTP   Neurologic: No focal sensory or motor deficits.  Musculoskeletal: Strength appropriate in all 4 extremities for age with no limited ROM.  Skin: Color appropriate for race. Intact, warm, and well-perfused.  Psychiatric: Appropriate mood and affect. No apparent risk to self or others.     MDM: Patient hemodynamically stable.  Concern for kidney stone.  Will get labs, CT, pain control and reassessment.

## 2025-07-08 NOTE — ED ADULT NURSE NOTE - NSFALLUNIVINTERV_ED_ALL_ED
Bed/Stretcher in lowest position, wheels locked, appropriate side rails in place/Call bell, personal items and telephone in reach/Instruct patient to call for assistance before getting out of bed/chair/stretcher/Non-slip footwear applied when patient is off stretcher/[place] to call system/Physically safe environment - no spills, clutter or unnecessary equipment/Purposeful proactive rounding/Room/bathroom lighting operational, light cord in reach

## 2025-07-10 ENCOUNTER — EMERGENCY (EMERGENCY)
Facility: HOSPITAL | Age: 40
LOS: 1 days | End: 2025-07-10
Attending: EMERGENCY MEDICINE | Admitting: EMERGENCY MEDICINE
Payer: COMMERCIAL

## 2025-07-10 VITALS
RESPIRATION RATE: 16 BRPM | DIASTOLIC BLOOD PRESSURE: 80 MMHG | SYSTOLIC BLOOD PRESSURE: 143 MMHG | OXYGEN SATURATION: 100 % | HEIGHT: 62 IN | HEART RATE: 82 BPM | TEMPERATURE: 98 F | WEIGHT: 100.09 LBS

## 2025-07-10 PROCEDURE — 99283 EMERGENCY DEPT VISIT LOW MDM: CPT

## 2025-07-10 NOTE — ED ADULT NURSE NOTE - IN ACCORDANCE WITH [place] LAW, WE OFFER EVERY PATIENT A HEPATITIS C TEST. WOULD YOU LIKE TO BE TESTED TODAY?
Medication Name: oxycodone 5 mg  Last office visit: 2-25-21  Next office visit: 7-6-21  Last refill: 4-21-21  Is patient due for refill: Yes on 5-21-21   Opt out

## 2025-07-10 NOTE — ED PROVIDER NOTE - CLINICAL SUMMARY MEDICAL DECISION MAKING FREE TEXT BOX
Pt with h/o endometriosis presents with vaginal bleeding x2 days approx 3-4 pads per day. Was seen here for pain 2 dyas ago, had US showing large R ovarian cyst, alivia by gyn thought to be endometrioma was discharged to f/u as outpt. Pt reports pain has been ok, has not needed any pain meds today and has not need percocet which was prescribed. She was last here her menstrual cycle bleeding was slowing down for last 2 days that is picked up which concerned her so she came in for evaluation.  Has not established outpatient gynecology follow-up yet.  On exam she is well-appearing, vital signs are stable denies any lightheadedness, dizziness, shortness of breath or chest pain or abdominal pain.  Abdomen soft nontender.  No concern for ovarian torsion, no concern for critical anemia from vaginal bleeding.  Patient here for reassurance, friend reassurance and also spoke with discharge lounge, process was already started from 2 days ago to help her get set up with a gynecologist and they will reach back out to the gynecology department to continue process of scheduling outpatient follow-up.  Patient provided with Aniya from the discharge lounge contact information for further assistance if she does not hear from gynecology in the next 24 hours.  Patient comfortable discharge. Pt with h/o endometriosis presents with vaginal bleeding x2 days approx 3-4 pads per day. Was seen here for pain 2 dyas ago, had US showing large R ovarian cyst, alivia by gyn thought to be endometrioma was discharged to f/u as outpt. Pt reports pain has been ok, has not needed any pain meds today and has not need percocet which was prescribed. She was last here her menstrual cycle bleeding was slowing down for last 2 days that is picked up which concerned her so she came in for evaluation. Not sexually active.  Has not established outpatient gynecology follow-up yet.  On exam she is well-appearing, vital signs are stable denies any lightheadedness, dizziness, shortness of breath or chest pain or abdominal pain.  Abdomen soft nontender.  No concern for ovarian torsion, no concern for critical anemia from vaginal bleeding.  Patient here for reassurance, friend reassurance and also spoke with discharge lounge, process was already started from 2 days ago to help her get set up with a gynecologist and they will reach back out to the gynecology department to continue process of scheduling outpatient follow-up.  Patient provided with Aniya from the discharge lounge contact information for further assistance if she does not hear from gynecology in the next 24 hours.  Patient comfortable discharge.

## 2025-07-10 NOTE — ED ADULT TRIAGE NOTE - CHIEF COMPLAINT QUOTE
Pt c/o vaginal bleeding and pelvic pain x1 week. Was recently seen on Monday for the same symptoms but reports symptoms have not resolved. Denies any dizziness, SOB or chest pain. PMHx endometriosis. LMP 6/30

## 2025-07-10 NOTE — ED PROVIDER NOTE - NSFOLLOWUPINSTRUCTIONS_ED_ALL_ED_FT
Please return to the emergency department if you have severe pain, heavy bleeding, lightheadedness, dizziness, chest pain or shortness of breath or fevers.  If you do not hear from the discharge planning team to help you get set up with appointment you can reach back out to us (see business card provided to you with discharge lounge information).

## 2025-07-10 NOTE — ED PROVIDER NOTE - PATIENT PORTAL LINK FT
You can access the FollowMyHealth Patient Portal offered by [facility] by registering at the following website: [email or web address]. By joining [facility]’s FollowMyHealth portal, you will also be able to view your health information using other applications (apps) compatible with our system.

## 2025-07-20 ENCOUNTER — EMERGENCY (EMERGENCY)
Facility: HOSPITAL | Age: 40
LOS: 1 days | End: 2025-07-20
Admitting: STUDENT IN AN ORGANIZED HEALTH CARE EDUCATION/TRAINING PROGRAM
Payer: COMMERCIAL

## 2025-07-20 VITALS
TEMPERATURE: 98 F | RESPIRATION RATE: 16 BRPM | HEART RATE: 76 BPM | DIASTOLIC BLOOD PRESSURE: 71 MMHG | WEIGHT: 100.09 LBS | SYSTOLIC BLOOD PRESSURE: 106 MMHG | OXYGEN SATURATION: 95 % | HEIGHT: 62 IN

## 2025-07-20 VITALS
HEART RATE: 76 BPM | OXYGEN SATURATION: 99 % | SYSTOLIC BLOOD PRESSURE: 109 MMHG | DIASTOLIC BLOOD PRESSURE: 60 MMHG | RESPIRATION RATE: 19 BRPM | TEMPERATURE: 97 F

## 2025-07-20 PROCEDURE — 99283 EMERGENCY DEPT VISIT LOW MDM: CPT

## 2025-07-20 PROCEDURE — 73630 X-RAY EXAM OF FOOT: CPT | Mod: 26,RT

## 2025-07-20 NOTE — ED PROVIDER NOTE - NSFOLLOWUPINSTRUCTIONS_ED_ALL_ED_FT
You can use 500-1000mg Tylenol every 6 hours for pain - as needed.  This is an over-the-counter medications - please respect the warnings on the label. This medication come with certain risks and side effects that you need to discuss with your doctor, especially if you are taking it for a prolonged period.    You can use 400-600mg Ibuprofen (such as motrin or advil) every 6 to 8 hours as needed for pain control.  Take ibuprofen with food or milk to lessen stomach upset.  This is an over-the-counter medication please respect the warnings on the label. All medications come with certain risks and side effects that you need to discuss with your doctor, especially if you are taking them for a prolonged period.    Toe Fracture    WHAT YOU NEED TO KNOW:    A toe fracture is a break in 1 or more of the bones in your toe. It is most commonly caused by a direct blow to the toe. The bones in your toe may just be broken, or they may be out of place or . Foot Anatomy    DISCHARGE INSTRUCTIONS:    Return to the emergency department if:     Blood soaks through your bandage.    You have severe pain in your toe.    Your toe is cold or numb.    Contact your healthcare provider if:     You have a fever.    Your pain does not go away, even after treatment.    Your toe continues to hurt even after it has healed.    You have questions or concerns about your condition or care.    Medicines: You may need any of the following:     NSAIDs, such as ibuprofen, help decrease swelling, pain, and fever. This medicine is available with or without a doctor's order. NSAIDs can cause stomach bleeding or kidney problems in certain people. If you take blood thinner medicine, always ask your healthcare provider if NSAIDs are safe for you. Always read the medicine label and follow directions.    Prescription pain medicine may be given. Ask your healthcare provider how to take this medicine safely. Some prescription pain medicines contain acetaminophen. Do not take other medicines that contain acetaminophen without talking to your healthcare provider. Too much acetaminophen may cause liver damage. Prescription pain medicine may cause constipation. Ask your healthcare provider how to prevent or treat constipation.     Antibiotics treat a bacterial infection. You may need antibiotics if you have an open wound.    Take your medicine as directed. Contact your healthcare provider if you think your medicine is not helping or if you have side effects. Tell him of her if you are allergic to any medicine. Keep a list of the medicines, vitamins, and herbs you take. Include the amounts, and when and why you take them. Bring the list or the pill bottles to follow-up visits. Carry your medicine list with you in case of an emergency.    Self-care:     Use buddy tape, an elastic bandage, or a splint as directed. These help keep your toe in its correct position as it heals. Buddy tape is when your fractured toe and the toe next to it are taped together.     Use support devices including a cane, crutches, walking boot, or hard soled shoe as directed. These help protect your broken toe and limit movement so it can heal.Walking Boot     Rest your toe so that it can heal. Return to normal activities as directed.    Apply ice on your toe for 15 to 20 minutes every hour or as directed. Use an ice pack, or put crushed ice in a plastic bag. Cover it with a towel. Ice helps prevent tissue damage and decreases swelling and pain.    Elevate your toe above the level of your heart as often as you can. This will help decrease swelling and pain. Prop your toe on pillows or blankets to keep it elevated comfortably.     Follow up with your healthcare provider as directed: You may need to see a podiatrist in 1 to 2 weeks. Write down your questions so you remember to ask them during your visits.        © Copyright [employer] 2019 All illustrations and images included in [employer] are the copyrighted property of [employer] or [employer]

## 2025-07-20 NOTE — ED PROVIDER NOTE - PATIENT PORTAL LINK FT
You can access the FollowMyHealth Patient Portal offered by [facility] by registering at the following website: [email or web address]. By joining [facility]’s FollowMyHealth portal, you will also be able to view your health information using other applications (apps) compatible with our system.

## 2025-07-20 NOTE — ED PROVIDER NOTE - CLINICAL SUMMARY MEDICAL DECISION MAKING FREE TEXT BOX
This 39-year-old female patient, with no pertinent past medical history, presents with a right foot injury involving the fifth toe, sustained two days ago when she accidentally kicked a table. She reports tenderness, bruising, and discomfort when walking and compensating putting pressure on her right heel. The patient arrived with the affected toe taped. She denies numbness, tingling, or other sensory deficits.    Reports took tylenol yesterday, at this time she does not pain mend     xray- reassess

## 2025-07-20 NOTE — ED ADULT TRIAGE NOTE - CHIEF COMPLAINT QUOTE
Pt presents to ED ambulatory from home with c/o R fifth toe injury 2 days ago with worsening pain and swelling. Pt reports she was walking when she struck her foot against table leg. Pt denies fall. Pt denies past medical hx.

## 2025-07-20 NOTE — ED PROVIDER NOTE - PROGRESS NOTE DETAILS
SWATI Barker- discussed preliminary xray result as appeared with a small fx to the distal phalanx  pt already body taped affected toe.   Detailed discussion with the patient  regarding the historical points, exam findings, and any diagnostic results supporting the discharge diagnosis.

## 2025-07-20 NOTE — ED ADULT NURSE NOTE - OBJECTIVE STATEMENT
Patient is a 39-year-old female, A&OX3, ambulatory self care at baseline presents to ED c/o R fifth toe injury X 2 days ago with worsening pain and swelling. Pt reports she was walking when she struck her foot against table leg. Pt denies fall. Breathing even and unlabored, chest rise equal b/l. Safety maintained through out.

## 2025-07-20 NOTE — ED PROVIDER NOTE - WET READ LAUNCH FT
It wasn't. Not sure why that showed up.   It was for pain There are no Wet Read(s) to document. There is 1 Wet Read(s) to document.